# Patient Record
Sex: FEMALE | Race: WHITE | URBAN - METROPOLITAN AREA
[De-identification: names, ages, dates, MRNs, and addresses within clinical notes are randomized per-mention and may not be internally consistent; named-entity substitution may affect disease eponyms.]

---

## 2018-09-07 ENCOUNTER — OFFICE VISIT (OUTPATIENT)
Dept: URBAN - METROPOLITAN AREA CLINIC 29 | Facility: CLINIC | Age: 68
End: 2018-09-07
Payer: MEDICARE

## 2018-09-07 PROCEDURE — 99213 OFFICE O/P EST LOW 20 MIN: CPT | Performed by: OPHTHALMOLOGY

## 2018-09-07 RX ORDER — LATANOPROST 50 UG/ML
0.005 % SOLUTION OPHTHALMIC
Qty: 3 | Refills: 3 | Status: INACTIVE
Start: 2018-09-07 | End: 2018-09-07

## 2018-09-07 ASSESSMENT — INTRAOCULAR PRESSURE
OD: 15
OS: 19

## 2018-09-07 NOTE — IMPRESSION/PLAN
Impression: Primary open-angle glaucoma, right eye, severe stage: H40.1113. Trab OD 3/20/18 IOP stable OU Fernando Rodriguez 72 stable OU Plan: Discussed diagnosis, explained and understood by patient. Discussed IOP/ONH/Glaucoma management and risks. Continue combigan bid os and lumigan qhs os. Taper pf qd od until empty. Will continue to monitor condition and symptoms.

## 2018-12-11 ENCOUNTER — OFFICE VISIT (OUTPATIENT)
Dept: URBAN - METROPOLITAN AREA CLINIC 29 | Facility: CLINIC | Age: 68
End: 2018-12-11
Payer: MEDICARE

## 2018-12-11 PROCEDURE — 99213 OFFICE O/P EST LOW 20 MIN: CPT | Performed by: OPHTHALMOLOGY

## 2018-12-11 RX ORDER — BRIMONIDINE TARTRATE, TIMOLOL MALEATE 2; 5 MG/ML; MG/ML
SOLUTION/ DROPS OPHTHALMIC
Qty: 1 | Refills: 10 | Status: INACTIVE
Start: 2018-12-11 | End: 2019-07-09

## 2018-12-11 ASSESSMENT — INTRAOCULAR PRESSURE
OS: 22
OD: 15

## 2018-12-11 NOTE — IMPRESSION/PLAN
Impression: Primary open-angle glaucoma, right eye, severe stage: H40.1113. Trab OD 3/20/18 IOP stable OU Fernando Rodriguez 72 stable OU Plan: Discussed diagnosis, explained and understood by patient. Discussed IOP/ONH/Glaucoma management and risks. Increase combigan tid os and continue lumigan qhs os. Will continue to monitor condition and symptoms. May need ALT if IOP still elevated OS.

## 2019-07-09 ENCOUNTER — OFFICE VISIT (OUTPATIENT)
Dept: URBAN - METROPOLITAN AREA CLINIC 29 | Facility: CLINIC | Age: 69
End: 2019-07-09
Payer: MEDICARE

## 2019-07-09 DIAGNOSIS — H40.1121 PRIMARY OPEN-ANGLE GLAUCOMA, LEFT EYE, MILD STAGE: ICD-10-CM

## 2019-07-09 PROCEDURE — 99214 OFFICE O/P EST MOD 30 MIN: CPT | Performed by: OPHTHALMOLOGY

## 2019-07-09 PROCEDURE — 92083 EXTENDED VISUAL FIELD XM: CPT | Performed by: OPHTHALMOLOGY

## 2019-07-09 RX ORDER — BRIMONIDINE TARTRATE, TIMOLOL MALEATE 2; 5 MG/ML; MG/ML
SOLUTION/ DROPS OPHTHALMIC
Qty: 1 | Refills: 10 | Status: INACTIVE
Start: 2019-07-09 | End: 2020-01-10

## 2019-07-09 RX ORDER — BIMATOPROST 0.1 MG/ML
0.01 % SOLUTION/ DROPS OPHTHALMIC
Qty: 1 | Refills: 10 | Status: INACTIVE
Start: 2019-07-09 | End: 2020-01-10

## 2019-07-09 ASSESSMENT — INTRAOCULAR PRESSURE
OS: 20
OD: 14

## 2019-07-09 NOTE — IMPRESSION/PLAN
Impression: Primary open-angle glaucoma, right eye, severe stage: H40.1113. Trab OD 3/20/18 IOP doing well ou - palliative therapy OD -
ONH stable OU Plan: Discussed diagnosis, explained and understood by patient. Discussed IOP/ONH/Glaucoma management and risks. VF results reviewed today. combigan tid os and continue lumigan qhs os. Will continue to monitor condition and symptoms.

## 2020-01-10 ENCOUNTER — OFFICE VISIT (OUTPATIENT)
Dept: URBAN - METROPOLITAN AREA CLINIC 29 | Facility: CLINIC | Age: 70
End: 2020-01-10
Payer: MEDICARE

## 2020-01-10 PROCEDURE — 99213 OFFICE O/P EST LOW 20 MIN: CPT | Performed by: OPHTHALMOLOGY

## 2020-01-10 RX ORDER — BIMATOPROST 0.1 MG/ML
0.01 % SOLUTION/ DROPS OPHTHALMIC
Qty: 1 | Refills: 10 | Status: INACTIVE
Start: 2020-01-10 | End: 2020-08-19

## 2020-01-10 RX ORDER — BRIMONIDINE TARTRATE, TIMOLOL MALEATE 2; 5 MG/ML; MG/ML
SOLUTION/ DROPS OPHTHALMIC
Qty: 1 | Refills: 10 | Status: INACTIVE
Start: 2020-01-10 | End: 2021-02-10

## 2020-01-10 ASSESSMENT — INTRAOCULAR PRESSURE
OD: 22
OS: 22

## 2020-01-10 NOTE — IMPRESSION/PLAN
Impression: Primary open-angle glaucoma, right eye, severe stage: H40.1113. Trab OD 3/20/18 IOP elevated OU - palliative therapy OD -
ONH stable OU Plan: Discussed diagnosis, explained and understood by patient. Discussed IOP/ONH/Glaucoma management and risks. OCT results reviewed today. combigan tid os and continue lumigan qhs os. Will continue to monitor condition and symptoms. 
Recommend consultation with Dr Hunter Qureshi for possible CEIOL and MIGS

## 2020-01-10 NOTE — IMPRESSION/PLAN
Impression: Age-related nuclear cataract, left eye: H25.12. Condition: quality of life issue. Symptoms: may improve with surgery. Plan: Discussed diagnosis in detail with patient. Discussed treatment options with patient. Discussed risks of progression. Consult recommended [Cataract Specialist].

## 2020-09-01 ENCOUNTER — OFFICE VISIT (OUTPATIENT)
Dept: URBAN - METROPOLITAN AREA CLINIC 29 | Facility: CLINIC | Age: 70
End: 2020-09-01
Payer: MEDICARE

## 2020-09-01 DIAGNOSIS — H40.1122 PRIMARY OPEN-ANGLE GLAUCOMA, LEFT EYE, MODERATE STAGE: ICD-10-CM

## 2020-09-01 DIAGNOSIS — H25.12 AGE-RELATED NUCLEAR CATARACT, LEFT EYE: ICD-10-CM

## 2020-09-01 PROCEDURE — 92020 GONIOSCOPY: CPT | Performed by: OPHTHALMOLOGY

## 2020-09-01 PROCEDURE — 92083 EXTENDED VISUAL FIELD XM: CPT | Performed by: OPHTHALMOLOGY

## 2020-09-01 PROCEDURE — 92014 COMPRE OPH EXAM EST PT 1/>: CPT | Performed by: OPHTHALMOLOGY

## 2020-09-01 RX ORDER — DORZOLAMIDE HCL 20 MG/ML
2 % SOLUTION/ DROPS OPHTHALMIC
Qty: 1 | Refills: 3 | Status: INACTIVE
Start: 2020-09-01 | End: 2020-12-22

## 2020-09-01 ASSESSMENT — INTRAOCULAR PRESSURE
OD: 20
OS: 24

## 2020-09-01 NOTE — IMPRESSION/PLAN
Impression: Age-related nuclear cataract, left eye: H25.12. Plan: Discussed cataract diagnosis with the patient. Risks and benefits of surgical treatment were discussed and understood. Patient defers surgical treatment at this time.

## 2020-09-01 NOTE — IMPRESSION/PLAN
Impression: Primary open-angle glaucoma, right eye, severe stage: H40.1113. Trab OD 3/20/18 - vision loss from surgery per pt
IOP elevated OU - palliative therapy OD  Plan: Pt has Glaucoma    Gonio : Open to Bare S 2+ PG       Pachs:536/695      Today's IOP :20/24     Tmax & date :46/30 Target IOP low teens Pt denies Fhx of Glaucoma Left eye is the only seeing eye Last vf OD: NLP OS: Inferior step 9/1/20 C/D:  Will confirm at [facility]
OCT:45 OS 1/10/20 Pt denies Sulfa Allergy   // Pt denies Lung /Heart dx Pt is currently using : combigan tid os and lumigan qhs os. Plan :
1. Continue Combigan BID OS (decreased from TID) Lumigan QHS OS Will Start patient on Dorzolamide BID OS 2. Discussed the need for cataract with MIGS OS. Patient defers surgery at this time 9/1/2020 - pt is very nervous given h/o vision loss OD after Trab - discussed at current IOP pt would have a high likelihood of continued vision loss and possible permanent blindness - pt understands but would like to think on procedure and try another eye drop first
3.  RTC 4-6 weeks IOP

## 2020-09-01 NOTE — IMPRESSION/PLAN
Impression: Primary open-angle glaucoma, left eye, moderate stage: E17.6996. Plan: see above assessment

## 2020-11-24 ENCOUNTER — OFFICE VISIT (OUTPATIENT)
Dept: URBAN - METROPOLITAN AREA CLINIC 29 | Facility: CLINIC | Age: 70
End: 2020-11-24
Payer: MEDICARE

## 2020-11-24 PROCEDURE — 92012 INTRM OPH EXAM EST PATIENT: CPT | Performed by: OPHTHALMOLOGY

## 2020-11-24 ASSESSMENT — INTRAOCULAR PRESSURE
OD: 17
OS: 18

## 2020-11-24 NOTE — IMPRESSION/PLAN
Impression: Primary open-angle glaucoma, right eye, severe stage: H40.1113. Trab OD 3/20/18 - vision loss from surgery per pt
IOP elevated OU - palliative therapy OD Plan: Pt has Glaucoma    Gonio : Open to Bare S 2+ PG       Pachs:536/695      Today's IOP :17/18      Tmax & date :46/30 Target IOP low teens Pt denies Fhx of Glaucoma Left eye is the only seeing eye Last vf OD: NLP OS: Inferior step 9/1/20 C/D:  Will confirm at [facility]
OCT:45 OS 1/10/20 Pt denies Sulfa Allergy   // Pt denies Lung /Heart dx Pt is currently using : combigan tid os and lumigan qhs os. Plan :
1. Continue Combigan BID OS (decreased from TID) Lumigan QHS OS
Dorzolamide BID OS 2. Discussed the need for cataract with MIGS OS. Patient defers surgery at this time 9/1/2020-11/24/2020 - pt is very nervous given h/o vision loss OD after Trab - discussed at current IOP pt would have a high likelihood of continued vision loss and possible permanent blindness - pt understands but would like to think on procedure and try another eye drop first
3.  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

## 2021-03-19 ENCOUNTER — OFFICE VISIT (OUTPATIENT)
Dept: URBAN - METROPOLITAN AREA CLINIC 29 | Facility: CLINIC | Age: 71
End: 2021-03-19
Payer: MEDICARE

## 2021-03-19 PROCEDURE — 92133 CPTRZD OPH DX IMG PST SGM ON: CPT | Performed by: OPHTHALMOLOGY

## 2021-03-19 PROCEDURE — 99214 OFFICE O/P EST MOD 30 MIN: CPT | Performed by: OPHTHALMOLOGY

## 2021-03-19 ASSESSMENT — INTRAOCULAR PRESSURE
OS: 20
OD: 17

## 2021-03-19 NOTE — IMPRESSION/PLAN
Impression: Primary open-angle glaucoma, right eye, severe stage: H40.1113. Trab OD 3/20/18 - vision loss from surgery per pt
IOP elevated OU - palliative therapy OD Plan: Pt has Glaucoma    Gonio : Open to Bare S 2+ PG       Pachs:536/695      Today's IOP :17/18      Tmax & date :46/30 Target IOP low teens Pt denies Fhx of Glaucoma Left eye is the only seeing eye Last vf OD: NLP OS: Inferior step 9/1/20 C/D:  Will confirm at [facility]
OCT:  39 OD ( 03/19/2021) Pt denies Sulfa Allergy   // Pt denies Lung /Heart dx Plan :
1. Cont:
Combigan BID OS Lumigan QHS OS - Switch to Rocklatan. Gave samples 03/19/2021 Dorzolamide BID OS 2. Discussed the need for cataract with MIGS OS. Patient defers surgery at this time 9/1/2020-11/24/2020-03/19/2021. Patient  is very nervous given h/o vision loss OD after Trab - discussed at current IOP pt would have a high likelihood of continued vision loss and possible permanent blindness - pt understands but would like to think on procedure and try another eye drop first
3. Return in 6-8 weeks  for IOP check with new medication.

## 2021-05-14 ENCOUNTER — OFFICE VISIT (OUTPATIENT)
Dept: URBAN - METROPOLITAN AREA CLINIC 29 | Facility: CLINIC | Age: 71
End: 2021-05-14
Payer: MEDICARE

## 2021-05-14 PROCEDURE — 99214 OFFICE O/P EST MOD 30 MIN: CPT | Performed by: OPHTHALMOLOGY

## 2021-05-14 ASSESSMENT — INTRAOCULAR PRESSURE
OS: 20
OD: 20

## 2021-05-14 NOTE — IMPRESSION/PLAN
Impression: Age-related nuclear cataract, left eye: H25.12. Plan: Discussed cataract diagnosis with the patient. Discussed risks, benefits and alternatives to surgery including but not limited to: bleeding, infection, risk of vision loss, loss of the eye, need for other surgery. Patient voiced understanding and wishes to proceed. Patient elects surgical treatment. Specialty lens options discussed and pt declines. Patient desires surgery OU (( AIM  PL OU,)) Patient understands the need for glasses after surgery for BCVA. MIGS Discussed with pt limitations of MIGS device and it does not replace  Glaucoma eye drops and would have to continue treatment and would still need glasses after surgery. Understands possible use of iris stretch. (+) Block
(+) Last case of CAT schedule Left eye second: Standard IOL + OMNI Hydrus ** Will generate order at PRE-OP/ [place]  to confirm MIGS 


15 Minutes Will send drops at time of PRE-OP.

## 2021-05-14 NOTE — IMPRESSION/PLAN
Impression: Primary open-angle glaucoma, right eye, severe stage: H40.1113. Trab OD 3/20/18 - vision loss from surgery per pt
IOP elevated OU - palliative therapy OD Plan: Pt has Glaucoma    Gonio : Open to Bare S 2+ PG       Pachs:536/695      Today's IOP :  20 OU      Tmax & date :46/30 Target IOP low teens Pt denies Fhx of Glaucoma Left eye is the only seeing eye Last vf OD: NLP OS: Inferior step 9/1/20 C/D:  Will confirm at [facility]
OCT:  39 OD ( 03/19/2021) Pt denies Sulfa Allergy   // Pt denies Lung /Heart dx Plan :
1. Cont:
Combigan BID OS Rocklatan QHS OS
Dorzolamide BID OS 2. Discussed the need for cataract with MIGS OS. Patient defers surgery at this time 9/1/2020-11/24/2020-03/19/2021-05/14/2021. Patient  is very nervous given h/o vision loss OD after Trab - discussed at current IOP pt would have a high likelihood of continued vision loss and possible permanent blindness - pt understands but would like to hold off until June.  
3. Return in Early June for PRE-OP for CE IOL OS

## 2021-09-21 ENCOUNTER — OFFICE VISIT (OUTPATIENT)
Dept: URBAN - METROPOLITAN AREA CLINIC 29 | Facility: CLINIC | Age: 71
End: 2021-09-21
Payer: MEDICARE

## 2021-09-21 PROCEDURE — 99214 OFFICE O/P EST MOD 30 MIN: CPT | Performed by: OPHTHALMOLOGY

## 2021-09-21 RX ORDER — KETOROLAC TROMETHAMINE 5 MG/ML
0.5 % SOLUTION OPHTHALMIC
Qty: 5 | Refills: 1 | Status: INACTIVE
Start: 2021-09-21 | End: 2021-11-02

## 2021-09-21 RX ORDER — OFLOXACIN 3 MG/ML
0.3 % SOLUTION/ DROPS OPHTHALMIC
Qty: 5 | Refills: 1 | Status: INACTIVE
Start: 2021-09-21 | End: 2021-11-02

## 2021-09-21 RX ORDER — PREDNISOLONE ACETATE 10 MG/ML
1 % SUSPENSION/ DROPS OPHTHALMIC
Qty: 10 | Refills: 1 | Status: INACTIVE
Start: 2021-09-21 | End: 2021-11-02

## 2021-09-21 ASSESSMENT — INTRAOCULAR PRESSURE
OS: 17
OD: 18

## 2021-09-21 ASSESSMENT — VISUAL ACUITY: OS: 20/500

## 2021-09-21 ASSESSMENT — KERATOMETRY: OS: 44.00

## 2021-09-21 NOTE — IMPRESSION/PLAN
Impression: Primary open-angle glaucoma, right eye, severe stage: H40.1113. Trab OD 3/20/18 - vision loss from surgery per pt
IOP elevated OU - palliative therapy OD Plan: Pt has Glaucoma    Gonio : Open to Bare S 2+ PG       Pachs:536/695      Today's IOP :  20 OU      Tmax & date :46/30 Target IOP low teens Pt denies Fhx of Glaucoma Left eye is the only seeing eye Last vf OD: NLP OS: Inferior step 9/1/20 C/D:  Will confirm at [facility]
OCT:  39 OD ( 03/19/2021) Pt denies Sulfa Allergy   // Pt denies Lung /Heart dx Plan :
1. Cont:
Combigan BID OS Rocklatan QHS OS
Dorzolamide BID OS 2. Discussed the need for cataract with MIGS OS. Patient defers surgery at this time 9/1/2020-11/24/2020-03/19/2021-05/14/2021. Patient  is very nervous given h/o vision loss OD after Trab - discussed at current IOP pt would have a high likelihood of continued vision loss and possible permanent blindness - pt understands but would like to hold off until June.  
3. Return in Early June for PRE-OP for CE IOL OS

## 2021-09-21 NOTE — IMPRESSION/PLAN
Impression: Age-related nuclear cataract, left eye: H25.12. Plan: Discussed cataract diagnosis with the patient. Discussed risks, benefits and alternatives to surgery including but not limited to: bleeding, infection, risk of vision loss, loss of the eye, need for other surgery. Patient voiced understanding and wishes to proceed. Patient elects surgical treatment. Specialty lens options discussed and pt declines. Patient desires surgery OU (( AIM  PL OU,)) Patient understands the need for glasses after surgery for BCVA. MIGS Discussed with pt limitations of MIGS device and it does not replace  Glaucoma eye drops and would have to continue treatment and would still need glasses after surgery. Understands possible use of iris stretch. (+) Block
(+) Last case of CAT schedule Left eye second: Standard IOL + OMNI Hydrus ** Will generate order at PRE-OP/ [place]  to confirm MIGS 


15 Minutes Will send drops at time of PRE-OP.

## 2021-10-14 ENCOUNTER — PRE-OPERATIVE VISIT (OUTPATIENT)
Dept: URBAN - METROPOLITAN AREA CLINIC 23 | Facility: CLINIC | Age: 71
End: 2021-10-14
Payer: MEDICARE

## 2021-10-14 ASSESSMENT — PACHYMETRY
OS: 3.18
OS: 23.75

## 2021-10-18 NOTE — IMPRESSION/PLAN
Impression: Primary open-angle glaucoma, left eye, moderate stage: H16.7979.  Plan: see above assessment

## 2021-10-27 ENCOUNTER — SURGERY (OUTPATIENT)
Dept: URBAN - METROPOLITAN AREA SURGERY 11 | Facility: SURGERY | Age: 71
End: 2021-10-27
Payer: MEDICARE

## 2021-10-27 PROCEDURE — 66175 TRLUML DIL AQ O/F CAN W/ST: CPT | Performed by: OPHTHALMOLOGY

## 2021-10-28 ENCOUNTER — POST-OPERATIVE VISIT (OUTPATIENT)
Dept: URBAN - METROPOLITAN AREA CLINIC 22 | Facility: CLINIC | Age: 71
End: 2021-10-28
Payer: MEDICARE

## 2021-10-28 DIAGNOSIS — Z48.810 ENCOUNTER FOR SURGICAL AFTERCARE FOLLOWING SURGERY ON A SENSE ORGAN: Primary | ICD-10-CM

## 2021-10-28 PROCEDURE — 99024 POSTOP FOLLOW-UP VISIT: CPT | Performed by: STUDENT IN AN ORGANIZED HEALTH CARE EDUCATION/TRAINING PROGRAM

## 2021-10-28 ASSESSMENT — INTRAOCULAR PRESSURE
OS: 20
OS: 14
OD: 14

## 2021-10-28 NOTE — IMPRESSION/PLAN
Impression: S/P Cataract Extraction by phacoemulsification with IOL placement; Shunt:  Hyrdrus; Omni OS - 1 Day. Encounter for surgical aftercare following surgery on a sense organ  Z48.810. Post operative instructions reviewed - Plan: Use drops as directed above/on handout. Continue glaucoma meds as directed by Dr. Sharyle Nones. RTC immediately if any sudden changes to vision or pain. OS:
--Continue Ofloxacin 0.3% QID x 1 week
--Continue ketorolac QID x  2 weeks --Taper Prednisolone acetate 1% QID x 1 weeks, TID x 1 week, BID x 1 week, QD x 1 week, then d/c

## 2021-11-02 ENCOUNTER — POST-OPERATIVE VISIT (OUTPATIENT)
Dept: URBAN - METROPOLITAN AREA CLINIC 28 | Facility: CLINIC | Age: 71
End: 2021-11-02
Payer: MEDICARE

## 2021-11-02 PROCEDURE — 99024 POSTOP FOLLOW-UP VISIT: CPT | Performed by: OPTOMETRIST

## 2021-11-02 RX ORDER — KETOROLAC TROMETHAMINE 5 MG/ML
0.5 % SOLUTION OPHTHALMIC
Qty: 5 | Refills: 1 | Status: INACTIVE
Start: 2021-10-27 | End: 2021-11-24

## 2021-11-02 RX ORDER — PREDNISOLONE ACETATE 10 MG/ML
1 % SUSPENSION/ DROPS OPHTHALMIC
Qty: 10 | Refills: 1 | Status: INACTIVE
Start: 2021-10-27 | End: 2021-11-24

## 2021-11-02 ASSESSMENT — VISUAL ACUITY: OS: 20/30

## 2021-11-02 ASSESSMENT — INTRAOCULAR PRESSURE
OS: 18
OD: 16

## 2021-11-02 NOTE — IMPRESSION/PLAN
Impression: S/P SA60WF 20.50; Shunt:  Hyrdrus; Omni OS - 6 Days. Presence of intraocular lens  Z96.1. Condition is improving - Opacified Capsule OS. Plan: --Advised patient to use artificial tears for comfort. --Discontinue Ofloxacin 0.3%--Taper Prednisolone acetate 1% TID x 1 wk, BID x 1wk, QD x 1wk, then d/c--Continue Ketorolac 0.5%

## 2021-11-24 ENCOUNTER — POST-OPERATIVE VISIT (OUTPATIENT)
Dept: URBAN - METROPOLITAN AREA CLINIC 23 | Facility: CLINIC | Age: 71
End: 2021-11-24

## 2021-11-24 DIAGNOSIS — Z96.1 PRESENCE OF INTRAOCULAR LENS: ICD-10-CM

## 2021-11-24 DIAGNOSIS — H40.1113 PRIMARY OPEN-ANGLE GLAUCOMA, RIGHT EYE, SEVERE STAGE: ICD-10-CM

## 2021-11-24 PROCEDURE — 99024 POSTOP FOLLOW-UP VISIT: CPT | Performed by: OPHTHALMOLOGY

## 2021-11-24 ASSESSMENT — INTRAOCULAR PRESSURE
OD: 13
OS: 22

## 2021-11-24 NOTE — IMPRESSION/PLAN
Impression: Presence of intraocular lens: Z96.1. Plan: Doing well post op Reassess IOP in 2 months once off all post op drops

## 2021-11-24 NOTE — IMPRESSION/PLAN
Impression: Primary open-angle glaucoma, right eye, severe stage: H40.1113. Trab OD 3/20/18 - vision loss from surgery per pt
IOP elevated OU - palliative therapy OD
S/P CAT SX OU with OMNI Hydrus  2021 Plan: Pt has Glaucoma    Gonio : Open to Bare S 2+ PG       Pachs:[phone]/695      Today's IOP :  13/22       Tmax & date :46/30 Target IOP low teens Pt denies Fhx of Glaucoma Left eye is the only seeing eye Last vf OD: NLP OS: Inferior step 9/1/20 C/D:  Will confirm at [facility]
OCT:  39 OD ( 03/19/2021) Pt denies Sulfa Allergy   // Pt denies Lung /Heart dx Plan :
1. Cont:
Combigan BID OS Rocklatan QHS OS
Dorzolamide BID OS
2. S/P CAT SX OU 3. IOP and condition appear stable today. No changes being made to current regimen. Recommend monitoring condition at this time. 
4. f/u iop check in 2 months

## 2022-02-08 ENCOUNTER — OFFICE VISIT (OUTPATIENT)
Dept: URBAN - METROPOLITAN AREA CLINIC 28 | Facility: CLINIC | Age: 72
End: 2022-02-08
Payer: MEDICARE

## 2022-02-08 PROCEDURE — 99024 POSTOP FOLLOW-UP VISIT: CPT | Performed by: OPHTHALMOLOGY

## 2022-02-08 ASSESSMENT — INTRAOCULAR PRESSURE
OD: 30
OS: 19

## 2022-02-08 NOTE — IMPRESSION/PLAN
Impression: Primary open-angle glaucoma, right eye, severe stage: H40.1113. Trab OD 3/20/18 - vision loss from surgery per pt
IOP elevated OU - palliative therapy OD
S/P CAT SX OU with OMNI Hydrus  2021 Plan: Pt has Glaucoma    Gonio : Open to Bare S 2+ PG       Pachs:536/695      Today's IOP :  30/19     Tmax & date :46/30 Target IOP low teens Pt denies Fhx of Glaucoma Left eye is the only seeing eye Last vf OD: NLP OS: Inferior step 9/1/20 C/D:  Will confirm at [facility]
OCT:  39 OD ( 03/19/2021) Pt denies Sulfa Allergy   // Pt denies Lung /Heart dx Plan :
1. Cont:
Combigan BID OS Rocklatan QHS OS
Dorzolamide BID OS
2. S/P CAT SX OU 3. IOP and condition appear stable today. No changes being made to current regimen. Recommend monitoring condition at this time. 
4. f/u iop check in 2 months

## 2022-04-05 ENCOUNTER — OFFICE VISIT (OUTPATIENT)
Dept: URBAN - METROPOLITAN AREA CLINIC 28 | Facility: CLINIC | Age: 72
End: 2022-04-05
Payer: MEDICARE

## 2022-04-05 PROCEDURE — 99213 OFFICE O/P EST LOW 20 MIN: CPT | Performed by: OPHTHALMOLOGY

## 2022-04-05 PROCEDURE — 92133 CPTRZD OPH DX IMG PST SGM ON: CPT | Performed by: OPHTHALMOLOGY

## 2022-04-05 ASSESSMENT — INTRAOCULAR PRESSURE
OS: 15
OD: 29

## 2022-04-05 NOTE — IMPRESSION/PLAN
Impression: Primary open-angle glaucoma, right eye, severe stage: H40.1113. Trab OD 3/20/18 - vision loss from surgery per pt
IOP elevated OU - palliative therapy OD
S/P CAT SX OU with OMNI Hydrus  2021 Plan: Pt has Glaucoma    Gonio : Open to Bare S 2+ PG       Pachs:[phone]/695      Today's IOP :  29/15    Tmax & date :46/30 Target IOP low teens OS; comfort care OD Pt denies Fhx of Glaucoma Left eye is the only seeing eye Last vf OD: NLP OS: Inferior step 9/1/20 C/D: Will confirm at [address] [place], [place] [address]way:  85 OD (05/
Pt denies Sulfa Allergy // Pt denies Lung /Heart dx Plan :
1. Cont:
Combigan BID OS Rocklatan QHS OS
Dorzolamide BID OS
2. S/P CAT SX OU 3. IOP and condition appear stable today. No changes being made to current regimen. Recommend monitoring condition at this time. IOP stable OD but more K edema - pt denies pain 4/5/22 4. 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

## 2022-04-05 NOTE — IMPRESSION/PLAN
Impression: Primary open-angle glaucoma, left eye, moderate stage: Z44.4390.  Plan: see above assessment

## 2022-07-05 ENCOUNTER — OFFICE VISIT (OUTPATIENT)
Dept: URBAN - METROPOLITAN AREA CLINIC 28 | Facility: CLINIC | Age: 72
End: 2022-07-05
Payer: MEDICARE

## 2022-07-05 DIAGNOSIS — Z96.1 PRESENCE OF INTRAOCULAR LENS: ICD-10-CM

## 2022-07-05 DIAGNOSIS — H40.1113 PRIMARY OPEN-ANGLE GLAUCOMA, RIGHT EYE, SEVERE STAGE: Primary | ICD-10-CM

## 2022-07-05 DIAGNOSIS — H40.1122 PRIMARY OPEN-ANGLE GLAUCOMA, LEFT EYE, MODERATE STAGE: ICD-10-CM

## 2022-07-05 PROCEDURE — 92133 CPTRZD OPH DX IMG PST SGM ON: CPT | Performed by: OPHTHALMOLOGY

## 2022-07-05 PROCEDURE — 99213 OFFICE O/P EST LOW 20 MIN: CPT | Performed by: OPHTHALMOLOGY

## 2022-07-05 ASSESSMENT — INTRAOCULAR PRESSURE
OD: 22
OS: 19

## 2022-07-05 NOTE — IMPRESSION/PLAN
Impression: Primary open-angle glaucoma, left eye, moderate stage: B15.3453.  Plan: see above assessment

## 2022-07-05 NOTE — IMPRESSION/PLAN
Impression: Primary open-angle glaucoma, right eye, severe stage: H40.1113. Trab OD 3/20/18 - vision loss from surgery per pt
IOP elevated OU - palliative therapy OD
S/P CAT SX OU with OMNI Hydrus  2021 Plan: Pt has Glaucoma    Gonio : Open to Bare S 2+ PG       Pachs:536/695      Today's IOP : 22/19 (By Tech)    Tmax  :46/30 Target IOP low teens OS; comfort care OD Pt denies Fhx of Glaucoma Left eye is the only seeing eye Last vf OD: NLP OS: Inferior step Stable in Size 7/5/22 C/D: 0.9x0.9 7/5/22 OCT:  OD unable OS 78 7/5/22 Pt denies Sulfa Allergy // Pt denies Lung /Heart dx Plan :
1. Cont:
Combigan BID OS Rocklatan QHS OS
Dorzolamide BID OS
2 IOP and condition appear stable today. No changes being made to current regimen. Recommend monitoring condition at this time. Patient denies pain OD 
IOP stable OD but more K edema - pt denies pain 4/5/22 4. f/u check in 3 months IOP with Optom and 6 month HVF and IOP with Randy Naidu

## 2023-01-19 ENCOUNTER — OFFICE VISIT (OUTPATIENT)
Dept: URBAN - METROPOLITAN AREA CLINIC 23 | Facility: CLINIC | Age: 73
End: 2023-01-19
Payer: MEDICARE

## 2023-01-19 DIAGNOSIS — H40.1122 PRIMARY OPEN-ANGLE GLAUCOMA, LEFT EYE, MODERATE STAGE: ICD-10-CM

## 2023-01-19 DIAGNOSIS — Z96.1 PRESENCE OF INTRAOCULAR LENS: ICD-10-CM

## 2023-01-19 DIAGNOSIS — H40.1113 PRIMARY OPEN-ANGLE GLAUCOMA, RIGHT EYE, SEVERE STAGE: Primary | ICD-10-CM

## 2023-01-19 PROCEDURE — 99214 OFFICE O/P EST MOD 30 MIN: CPT | Performed by: OPHTHALMOLOGY

## 2023-01-19 ASSESSMENT — INTRAOCULAR PRESSURE
OS: 19
OD: 17

## 2023-01-19 NOTE — IMPRESSION/PLAN
Impression: Primary open-angle glaucoma, left eye, moderate stage: X62.5974.  Plan: see above assessment

## 2023-01-19 NOTE — IMPRESSION/PLAN
Impression: Primary open-angle glaucoma, right eye, severe stage: H40.1113. Trab OD 3/20/18 - vision loss from surgery per pt
IOP elevated OU - palliative therapy OD
S/P CAT SX OU with OMNI Hydrus  2021 Plan: Pt has Glaucoma    Gonio : Open to Bare S 2+ PG       Pachs:[phone]/695      Today's IOP : 17/19    Tmax  :46/30 Target IOP low teens OS; comfort care OD Pt denies Fhx of Glaucoma Left eye is the only seeing eye Last vf OD: NLP OS: Inferior step Stable in Size 7/5/22 C/D: 0.75x0.8//0.9x0.9 OCT:  OD unable OS 78 7/5/22 Pt denies Sulfa Allergy // Pt denies Lung /Heart dx Plan :
1. Cont:
Combigan BID OS Rocklatan QHS OS
Dorzolamide BID OS
2 IOP and condition appear stable today. No changes being made to current regimen. Recommend monitoring condition at this time. 3. Return in 3 months for HVF OS and DFE OS - optometry for next exam 
IF stable continue optometry medical management; return to DR. Parrish if not stable on testing

## 2023-05-09 ENCOUNTER — OFFICE VISIT (OUTPATIENT)
Dept: URBAN - METROPOLITAN AREA CLINIC 23 | Facility: CLINIC | Age: 73
End: 2023-05-09
Payer: MEDICARE

## 2023-05-09 DIAGNOSIS — H40.1122 PRIMARY OPEN-ANGLE GLAUCOMA, LEFT EYE, MODERATE STAGE: ICD-10-CM

## 2023-05-09 DIAGNOSIS — H40.1113 PRIMARY OPEN-ANGLE GLAUCOMA, RIGHT EYE, SEVERE STAGE: Primary | ICD-10-CM

## 2023-05-09 PROCEDURE — 99213 OFFICE O/P EST LOW 20 MIN: CPT | Performed by: OPTOMETRIST

## 2023-05-09 PROCEDURE — 92083 EXTENDED VISUAL FIELD XM: CPT | Performed by: OPTOMETRIST

## 2023-05-09 ASSESSMENT — INTRAOCULAR PRESSURE
OD: 21
OS: 19

## 2023-05-09 ASSESSMENT — KERATOMETRY: OS: 44.25

## 2023-05-09 NOTE — IMPRESSION/PLAN
Impression: Primary open-angle glaucoma, right eye, severe stage: H40.1113. Trab OD 3/20/18 - vision loss from surgery per pt
IOP elevated OU - palliative therapy OD
S/P CAT SX OU with OMNI Hydrus  2021 Plan: Pt has Glaucoma    Gonio : Open to Bare S 2+ PG       Pachs:[phone]/695      Today's IOP : 21/19    Tmax  :46/30 Target IOP low teens OS; comfort care OD Pt denies Fhx of Glaucoma Left eye is the only seeing eye Last vf OD: NLP OS: Inferior step Stable in Size 7/5/22 C/D: 0.75x0.8//0.9x0.9 OCT:  OD unable OS 78 7/5/22 Pt denies Sulfa Allergy // Pt denies Lung /Heart dx Plan :
1. Cont:
Combigan BID OS Rocklatan QHS OS
Dorzolamide BID OS
2 IOP and condition appear stable today. No changes being made to current regimen. Recommend monitoring condition at this time. 
3. Return in 4 months for an IOP check

## 2023-05-09 NOTE — IMPRESSION/PLAN
Impression: Primary open-angle glaucoma, left eye, moderate stage: Q68.3949.  Plan: see above assessment

## 2023-09-12 ENCOUNTER — OFFICE VISIT (OUTPATIENT)
Dept: URBAN - METROPOLITAN AREA CLINIC 23 | Facility: CLINIC | Age: 73
End: 2023-09-12
Payer: COMMERCIAL

## 2023-09-12 DIAGNOSIS — H40.1122 PRIMARY OPEN-ANGLE GLAUCOMA, LEFT EYE, MODERATE STAGE: ICD-10-CM

## 2023-09-12 DIAGNOSIS — H40.1113 PRIMARY OPEN-ANGLE GLAUCOMA, RIGHT EYE, SEVERE STAGE: Primary | ICD-10-CM

## 2023-09-12 PROCEDURE — 92133 CPTRZD OPH DX IMG PST SGM ON: CPT | Performed by: OPTOMETRIST

## 2023-09-12 PROCEDURE — 99213 OFFICE O/P EST LOW 20 MIN: CPT | Performed by: OPTOMETRIST

## 2023-09-12 ASSESSMENT — INTRAOCULAR PRESSURE
OD: 24
OS: 24

## 2024-01-09 ENCOUNTER — OFFICE VISIT (OUTPATIENT)
Dept: URBAN - METROPOLITAN AREA CLINIC 23 | Facility: CLINIC | Age: 74
End: 2024-01-09
Payer: COMMERCIAL

## 2024-01-09 DIAGNOSIS — H40.1122 PRIMARY OPEN-ANGLE GLAUCOMA, LEFT EYE, MODERATE STAGE: ICD-10-CM

## 2024-01-09 DIAGNOSIS — H40.1113 PRIMARY OPEN-ANGLE GLAUCOMA, RIGHT EYE, SEVERE STAGE: Primary | ICD-10-CM

## 2024-01-09 PROCEDURE — 99213 OFFICE O/P EST LOW 20 MIN: CPT | Performed by: OPTOMETRIST

## 2024-01-09 ASSESSMENT — KERATOMETRY
OD: 41.75
OS: 44.00

## 2024-01-09 ASSESSMENT — INTRAOCULAR PRESSURE
OS: 20
OD: 24

## 2024-03-19 NOTE — IMPRESSION/PLAN
Impression: Presence of intraocular lens: Z96.1.  Plan: Doing well post op - [facility] with [facility] Medication Scribe Admission Medication History    Admission medication history is complete. The information provided in this note is only as accurate as the sources available at the time of the update.    Information Source(s): Patient and CareEverywhere/SureScripts via in-person    Pertinent Information: N/A    Changes made to PTA medication list:  Added: None  Deleted: caltrate  Changed: None    Allergies reviewed with patient and updates made in EHR: yes    Medication History Completed By: Fernanda Guzman 3/19/2024 3:47 PM    PTA Med List   Medication Sig Last Dose    eszopiclone (LUNESTA) 3 MG tablet Take 3 mg by mouth at bedtime 3/18/2024 at pm    gabapentin (NEURONTIN) 600 MG tablet Take 600 mg by mouth 3 times daily 3/19/2024 at am    levothyroxine (SYNTHROID/LEVOTHROID) 100 MCG tablet Take 100 mcg by mouth daily 3/19/2024 at am    melatonin 5 MG tablet Take 10 mg by mouth at bedtime Takes 1 hour prior to Lunesta 3/18/2024 at pm    multivitamin w/minerals (THERA-VIT-M) tablet Take 1 tablet by mouth daily 3/19/2024 at am    nicotine polacrilex (NICORETTE) 4 MG gum Place 4 mg inside cheek every hour as needed for smoking cessation Past Month    omeprazole (PRILOSEC) 40 MG DR capsule Take 40 mg by mouth daily 3/19/2024 at am    ondansetron (ZOFRAN ODT) 4 MG ODT tab Take 4 mg by mouth every 8 hours as needed for vomiting or nausea 3/19/2024 at am    propranolol (INDERAL) 20 MG tablet Take 20 mg by mouth 3 times daily 3/19/2024 at am    thiamine (B-1) 100 MG tablet Take 1 tablet (100 mg) by mouth daily 3/19/2024 at am

## 2024-05-06 ENCOUNTER — OFFICE VISIT (OUTPATIENT)
Dept: URBAN - METROPOLITAN AREA CLINIC 23 | Facility: CLINIC | Age: 74
End: 2024-05-06
Payer: COMMERCIAL

## 2024-05-06 DIAGNOSIS — H40.1122 PRIMARY OPEN-ANGLE GLAUCOMA, LEFT EYE, MODERATE STAGE: Primary | ICD-10-CM

## 2024-05-06 PROCEDURE — 92083 EXTENDED VISUAL FIELD XM: CPT | Performed by: OPTOMETRIST

## 2024-05-06 PROCEDURE — 99213 OFFICE O/P EST LOW 20 MIN: CPT | Performed by: OPTOMETRIST

## 2024-05-06 ASSESSMENT — INTRAOCULAR PRESSURE
OS: 21
OD: 23

## 2024-05-06 ASSESSMENT — KERATOMETRY: OS: 43.63

## 2024-09-09 ENCOUNTER — OFFICE VISIT (OUTPATIENT)
Dept: URBAN - METROPOLITAN AREA CLINIC 23 | Facility: CLINIC | Age: 74
End: 2024-09-09
Payer: COMMERCIAL

## 2024-09-09 DIAGNOSIS — H40.1122 PRIMARY OPEN-ANGLE GLAUCOMA, LEFT EYE, MODERATE STAGE: Primary | ICD-10-CM

## 2024-09-09 PROCEDURE — 99213 OFFICE O/P EST LOW 20 MIN: CPT | Performed by: OPTOMETRIST

## 2024-09-09 ASSESSMENT — INTRAOCULAR PRESSURE
OS: 15
OD: 14
OS: 14
OD: 12

## 2025-01-13 ENCOUNTER — OFFICE VISIT (OUTPATIENT)
Dept: URBAN - METROPOLITAN AREA CLINIC 23 | Facility: CLINIC | Age: 75
End: 2025-01-13
Payer: COMMERCIAL

## 2025-01-13 DIAGNOSIS — H26.492 OTHER SECONDARY CATARACT, LEFT EYE: ICD-10-CM

## 2025-01-13 DIAGNOSIS — H40.1113 PRIMARY OPEN-ANGLE GLAUCOMA, RIGHT EYE, SEVERE STAGE: ICD-10-CM

## 2025-01-13 DIAGNOSIS — H40.1122 PRIMARY OPEN-ANGLE GLAUCOMA, LEFT EYE, MODERATE STAGE: Primary | ICD-10-CM

## 2025-01-13 PROCEDURE — 99213 OFFICE O/P EST LOW 20 MIN: CPT | Performed by: OPTOMETRIST

## 2025-01-13 PROCEDURE — 92133 CPTRZD OPH DX IMG PST SGM ON: CPT | Performed by: OPTOMETRIST

## 2025-01-13 ASSESSMENT — INTRAOCULAR PRESSURE
OS: 15
OD: 15

## 2025-01-13 ASSESSMENT — KERATOMETRY: OS: 44.00

## 2025-05-28 ENCOUNTER — OFFICE VISIT (OUTPATIENT)
Dept: URBAN - METROPOLITAN AREA CLINIC 23 | Facility: CLINIC | Age: 75
End: 2025-05-28
Payer: COMMERCIAL

## 2025-05-28 DIAGNOSIS — H26.492 OTHER SECONDARY CATARACT, LEFT EYE: ICD-10-CM

## 2025-05-28 DIAGNOSIS — H40.1113 PRIMARY OPEN-ANGLE GLAUCOMA, RIGHT EYE, SEVERE STAGE: ICD-10-CM

## 2025-05-28 DIAGNOSIS — H40.1122 PRIMARY OPEN-ANGLE GLAUCOMA, LEFT EYE, MODERATE STAGE: Primary | ICD-10-CM

## 2025-05-28 PROCEDURE — 99213 OFFICE O/P EST LOW 20 MIN: CPT | Performed by: OPTOMETRIST

## 2025-05-28 RX ORDER — NETARSUDIL AND LATANOPROST OPHTHALMIC SOLUTION, 0.02%/0.005% .2; .05 MG/ML; MG/ML
SOLUTION/ DROPS OPHTHALMIC; TOPICAL
Qty: 2.5 | Refills: 11 | Status: INACTIVE
Start: 2025-05-28 | End: 2025-05-29

## 2025-05-28 ASSESSMENT — INTRAOCULAR PRESSURE
OS: 15
OD: 15